# Patient Record
Sex: FEMALE | Race: WHITE | NOT HISPANIC OR LATINO | URBAN - METROPOLITAN AREA
[De-identification: names, ages, dates, MRNs, and addresses within clinical notes are randomized per-mention and may not be internally consistent; named-entity substitution may affect disease eponyms.]

---

## 2020-07-25 ENCOUNTER — TELEPHONE ENCOUNTER (OUTPATIENT)
Dept: URBAN - METROPOLITAN AREA CLINIC 13 | Facility: CLINIC | Age: 60
End: 2020-07-25

## 2020-07-25 RX ORDER — DEXLANSOPRAZOLE 30 MG/1
TAKE 1 CAPSULE TWICE DAILY CAPSULE, DELAYED RELEASE ORAL
Qty: 180 | Refills: 3 | OUTPATIENT
Start: 2014-11-06 | End: 2014-11-17

## 2020-07-26 ENCOUNTER — TELEPHONE ENCOUNTER (OUTPATIENT)
Dept: URBAN - METROPOLITAN AREA CLINIC 13 | Facility: CLINIC | Age: 60
End: 2020-07-26

## 2020-07-26 RX ORDER — PRAVASTATIN SODIUM 40 MG/1
TABLET ORAL
Qty: 30 | Refills: 0 | Status: ACTIVE | COMMUNITY
Start: 2014-04-16

## 2020-07-26 RX ORDER — CIPROFLOXACIN HYDROCHLORIDE 500 MG/1
TABLET, FILM COATED ORAL
Qty: 20 | Refills: 0 | Status: ACTIVE | COMMUNITY
Start: 2014-06-03

## 2020-07-26 RX ORDER — AMOXICILLIN 500 MG/1
CAPSULE ORAL
Qty: 20 | Refills: 0 | Status: ACTIVE | COMMUNITY
Start: 2014-06-18

## 2020-07-26 RX ORDER — LEVOTHYROXINE SODIUM 0.09 MG/1
TAKE 1 TABLET DAILY AS DIRECTED TABLET ORAL
Refills: 0 | Status: ACTIVE | COMMUNITY

## 2020-07-26 RX ORDER — LISINOPRIL 20 MG/1
TABLET ORAL
Qty: 30 | Refills: 0 | Status: ACTIVE | COMMUNITY
Start: 2013-08-19

## 2020-07-26 RX ORDER — PREDNISONE 50 MG/1
1 BY MOUTH 13 HOURS PRIOR TO STUDY, 7 HOURS PRIOR TO STUDY,AND 1 HOUR PRIOR TO IV CONTRAST INJECTION TABLET ORAL
Qty: 3 | Refills: 0 | Status: ACTIVE | COMMUNITY
Start: 2014-11-06

## 2020-07-26 RX ORDER — CIPROFLOXACIN HYDROCHLORIDE 500 MG/1
TABLET, FILM COATED ORAL
Qty: 10 | Refills: 0 | Status: ACTIVE | COMMUNITY
Start: 2013-06-07

## 2020-07-26 RX ORDER — CYCLOBENZAPRINE HYDROCHLORIDE 5 MG/1
TABLET, FILM COATED ORAL
Qty: 30 | Refills: 0 | Status: ACTIVE | COMMUNITY
Start: 2014-04-16

## 2020-07-26 RX ORDER — GABAPENTIN 300 MG/1
TAKE 1 CAPSULE TWICE DAILY CAPSULE ORAL
Qty: 60 | Refills: 0 | Status: ACTIVE | COMMUNITY

## 2020-07-26 RX ORDER — OMEPRAZOLE 20 MG/1
TAKE 1 CAPSULE TWICE DAILY CAPSULE, DELAYED RELEASE ORAL
Qty: 60 | Refills: 0 | Status: ACTIVE | COMMUNITY
Start: 2014-11-18

## 2020-07-26 RX ORDER — DULOXETINE HCL 30 MG
TAKE 1 CAPSULE DAILY CAPSULE,DELAYED RELEASE (ENTERIC COATED) ORAL
Refills: 0 | Status: ACTIVE | COMMUNITY

## 2020-07-26 RX ORDER — DEXLANSOPRAZOLE 60 MG/1
TAKE 1 CAPSULE DAILY EVERY MORNING BEFORE BREAKFAST CAPSULE, DELAYED RELEASE ORAL
Qty: 30 | Refills: 3 | Status: ACTIVE | COMMUNITY
Start: 2014-11-17

## 2020-07-26 RX ORDER — HYDROCHLOROTHIAZIDE 25 MG/1
TAKE 1 TABLET DAILY TABLET ORAL
Refills: 0 | Status: ACTIVE | COMMUNITY

## 2020-07-26 RX ORDER — PHENTERMINE HYDROCHLORIDE 30 MG/1
TABLET ORAL
Qty: 30 | Refills: 0 | Status: ACTIVE | COMMUNITY
Start: 2014-03-26

## 2020-07-26 RX ORDER — ASPIRIN 81 MG/1
TAKE 1 TABLET DAILY TABLET, DELAYED RELEASE ORAL
Refills: 0 | Status: ACTIVE | COMMUNITY

## 2020-07-26 RX ORDER — LISINOPRIL 20 MG/1
TAKE 1 TABLET DAILY AS DIRECTED TABLET ORAL
Refills: 0 | Status: ACTIVE | COMMUNITY

## 2020-07-26 RX ORDER — HYDROCHLOROTHIAZIDE 25 MG/1
TABLET ORAL
Qty: 90 | Refills: 0 | Status: ACTIVE | COMMUNITY
Start: 2014-06-18

## 2020-07-26 RX ORDER — GABAPENTIN 300 MG/1
CAPSULE ORAL
Qty: 60 | Refills: 0 | Status: ACTIVE | COMMUNITY
Start: 2014-03-26

## 2020-07-26 RX ORDER — LEVOTHYROXINE SODIUM 0.09 MG/1
TABLET ORAL
Qty: 30 | Refills: 0 | Status: ACTIVE | COMMUNITY
Start: 2014-03-26

## 2020-07-26 RX ORDER — POLYETHYLENE GLYCOL 3350, SODIUM CHLORIDE, SODIUM BICARBONATE AND POTASSIUM CHLORIDE WITH LEMON FLAVOR 420; 11.2; 5.72; 1.48 G/4L; G/4L; G/4L; G/4L
TAKE AS DIRECTED POWDER, FOR SOLUTION ORAL
Qty: 1 | Refills: 0 | Status: ACTIVE | COMMUNITY
Start: 2014-11-06

## 2020-07-26 RX ORDER — PRAVASTATIN SODIUM 40 MG/1
TAKE 1 TABLET DAILY TABLET ORAL
Refills: 0 | Status: ACTIVE | COMMUNITY

## 2020-07-26 RX ORDER — FLUCONAZOLE 100 MG/1
TABLET ORAL
Qty: 10 | Refills: 0 | Status: ACTIVE | COMMUNITY
Start: 2013-08-19

## 2020-07-26 RX ORDER — OMEPRAZOLE 20 MG/1
CAPSULE, DELAYED RELEASE ORAL
Qty: 30 | Refills: 0 | Status: ACTIVE | COMMUNITY
Start: 2014-06-03

## 2022-06-02 ENCOUNTER — TELEPHONE ENCOUNTER (OUTPATIENT)
Dept: URBAN - METROPOLITAN AREA CLINIC 113 | Facility: CLINIC | Age: 62
End: 2022-06-02

## 2022-06-03 ENCOUNTER — CLAIMS CREATED FROM THE CLAIM WINDOW (OUTPATIENT)
Dept: URBAN - METROPOLITAN AREA CLINIC 113 | Facility: CLINIC | Age: 62
End: 2022-06-03
Payer: MEDICARE

## 2022-06-03 ENCOUNTER — LAB OUTSIDE AN ENCOUNTER (OUTPATIENT)
Dept: URBAN - METROPOLITAN AREA CLINIC 113 | Facility: CLINIC | Age: 62
End: 2022-06-03

## 2022-06-03 ENCOUNTER — WEB ENCOUNTER (OUTPATIENT)
Dept: URBAN - METROPOLITAN AREA CLINIC 113 | Facility: CLINIC | Age: 62
End: 2022-06-03

## 2022-06-03 VITALS
BODY MASS INDEX: 45.99 KG/M2 | WEIGHT: 293 LBS | TEMPERATURE: 98.2 F | HEIGHT: 67 IN | DIASTOLIC BLOOD PRESSURE: 80 MMHG | RESPIRATION RATE: 18 BRPM | SYSTOLIC BLOOD PRESSURE: 128 MMHG | HEART RATE: 77 BPM

## 2022-06-03 DIAGNOSIS — E66.9 CLASS 3 OBESITY: ICD-10-CM

## 2022-06-03 DIAGNOSIS — Z79.01 CHRONIC ANTICOAGULATION: ICD-10-CM

## 2022-06-03 DIAGNOSIS — R19.4 CHANGE IN BOWEL HABITS: ICD-10-CM

## 2022-06-03 DIAGNOSIS — R10.32 LLQ ABDOMINAL PAIN: ICD-10-CM

## 2022-06-03 PROBLEM — 819948005: Status: ACTIVE | Noted: 2022-06-03

## 2022-06-03 PROBLEM — 711150003: Status: ACTIVE | Noted: 2022-06-03

## 2022-06-03 PROCEDURE — 99214 OFFICE O/P EST MOD 30 MIN: CPT | Performed by: INTERNAL MEDICINE

## 2022-06-03 RX ORDER — HYDROCHLOROTHIAZIDE 25 MG/1
TAKE 1 TABLET DAILY TABLET ORAL
Refills: 0 | Status: DISCONTINUED | COMMUNITY

## 2022-06-03 RX ORDER — FLUCONAZOLE 100 MG/1
TABLET ORAL
Qty: 10 | Refills: 0 | Status: DISCONTINUED | COMMUNITY
Start: 2013-08-19

## 2022-06-03 RX ORDER — PRAVASTATIN SODIUM 40 MG/1
TAKE 1 TABLET DAILY TABLET ORAL
Refills: 0 | Status: DISCONTINUED | COMMUNITY

## 2022-06-03 RX ORDER — OMEPRAZOLE 40 MG/1
1 CAPSULE 30 MINUTES BEFORE MORNING MEAL CAPSULE, DELAYED RELEASE ORAL ONCE A DAY
Refills: 0 | Status: ACTIVE | COMMUNITY
Start: 2014-06-03

## 2022-06-03 RX ORDER — PREDNISONE 50 MG/1
1 BY MOUTH 13 HOURS PRIOR TO STUDY, 7 HOURS PRIOR TO STUDY,AND 1 HOUR PRIOR TO IV CONTRAST INJECTION TABLET ORAL
Qty: 3 | Refills: 0 | Status: DISCONTINUED | COMMUNITY
Start: 2014-11-06

## 2022-06-03 RX ORDER — LEVOTHYROXINE SODIUM 100 UG/1
1 TABLET IN THE MORNING ON AN EMPTY STOMACH TABLET ORAL ONCE A DAY
Refills: 0 | Status: ACTIVE | COMMUNITY

## 2022-06-03 RX ORDER — POLYETHYLENE GLYCOL 3350, SODIUM CHLORIDE, SODIUM BICARBONATE AND POTASSIUM CHLORIDE WITH LEMON FLAVOR 420; 11.2; 5.72; 1.48 G/4L; G/4L; G/4L; G/4L
TAKE AS DIRECTED POWDER, FOR SOLUTION ORAL
Qty: 1 | Refills: 0 | Status: DISCONTINUED | COMMUNITY
Start: 2014-11-06

## 2022-06-03 RX ORDER — APIXABAN 5 MG/1
1 TABLET TABLET, FILM COATED ORAL
Status: ACTIVE | COMMUNITY

## 2022-06-03 RX ORDER — CYCLOBENZAPRINE HYDROCHLORIDE 5 MG/1
TABLET, FILM COATED ORAL
Qty: 30 | Refills: 0 | Status: DISCONTINUED | COMMUNITY
Start: 2014-04-16

## 2022-06-03 RX ORDER — ASPIRIN 81 MG/1
TAKE 1 TABLET DAILY TABLET, DELAYED RELEASE ORAL
Refills: 0 | Status: DISCONTINUED | COMMUNITY

## 2022-06-03 RX ORDER — LINACLOTIDE 290 UG/1
1 CAPSULE AT LEAST 30 MINUTES BEFORE THE FIRST MEAL OF THE DAY ON AN EMPTY STOMACH CAPSULE, GELATIN COATED ORAL ONCE A DAY
OUTPATIENT

## 2022-06-03 RX ORDER — LISINOPRIL 20 MG/1
TAKE 1 TABLET DAILY AS DIRECTED TABLET ORAL
Refills: 0 | Status: DISCONTINUED | COMMUNITY

## 2022-06-03 RX ORDER — LINACLOTIDE 290 UG/1
1 CAPSULE AT LEAST 30 MINUTES BEFORE THE FIRST MEAL OF THE DAY ON AN EMPTY STOMACH CAPSULE, GELATIN COATED ORAL ONCE A DAY
Status: ACTIVE | COMMUNITY

## 2022-06-03 RX ORDER — CHOLECALCIFEROL TAB 50 MCG (2000 UNIT) 50 MCG
ONCE PER WEEK TAB ORAL
Status: ACTIVE | COMMUNITY

## 2022-06-03 RX ORDER — DICYCLOMINE HYDROCHLORIDE 10 MG/1
1 TABLET CAPSULE ORAL
Status: ACTIVE | COMMUNITY

## 2022-06-03 RX ORDER — DICYCLOMINE HYDROCHLORIDE 10 MG/1
1 TABLET CAPSULE ORAL
Qty: 90 | Refills: 5

## 2022-06-03 RX ORDER — CIPROFLOXACIN HYDROCHLORIDE 500 MG/1
TABLET, FILM COATED ORAL
Qty: 10 | Refills: 0 | Status: DISCONTINUED | COMMUNITY
Start: 2013-06-07

## 2022-06-03 RX ORDER — DEXLANSOPRAZOLE 60 MG/1
TAKE 1 CAPSULE DAILY EVERY MORNING BEFORE BREAKFAST CAPSULE, DELAYED RELEASE ORAL
Qty: 30 | Refills: 3 | Status: DISCONTINUED | COMMUNITY
Start: 2014-11-17

## 2022-06-03 RX ORDER — GABAPENTIN 300 MG/1
2 CAOS CAPSULE ORAL
Refills: 0 | Status: ACTIVE | COMMUNITY

## 2022-06-03 RX ORDER — DILTIAZEM HYDROCHLORIDE 180 MG/1
1 CAPSULE CAPSULE, COATED, EXTENDED RELEASE ORAL
Status: ACTIVE | COMMUNITY

## 2022-06-03 RX ORDER — PHENTERMINE HYDROCHLORIDE 30 MG/1
TABLET ORAL
Qty: 30 | Refills: 0 | Status: DISCONTINUED | COMMUNITY
Start: 2014-03-26

## 2022-06-03 RX ORDER — AMOXICILLIN 500 MG/1
CAPSULE ORAL
Qty: 20 | Refills: 0 | Status: DISCONTINUED | COMMUNITY
Start: 2014-06-18

## 2022-06-03 RX ORDER — DULOXETINE HYDROCHLORIDE 60 MG/1
1 CAPSULE CAPSULE, DELAYED RELEASE ORAL ONCE A DAY
Refills: 0 | Status: ACTIVE | COMMUNITY

## 2022-06-03 RX ORDER — ROSUVASTATIN CALCIUM 20 MG/1
1 TABLET TABLET, FILM COATED ORAL ONCE A DAY
Status: ACTIVE | COMMUNITY

## 2022-06-03 NOTE — HPI-TODAY'S VISIT:
Ms. Pereira is a 61-year-old woman with multiple medical problems including chronic renal insufficiency, irritable bowel syndrome, history of gastric sleeve, atrial fibrillation status post ablation on Eliquis presenting for evaluation of left lower quadrant pain and change in bowel habits. She reports being in her usual state of health until 2 months ago when she developed nausea, vomiting and diarrhea.  The nausea and vomiting fairly quickly resolved but she has continued to have loose, thinner caliber stools since that time.  She also reports an increased amount of left lower quadrant abdominal pain.  She presented to the [facility] emergency room on 2 occasions, 5/7/2022 then 5/16/2022 when she was hospitalized until 5/19/2022 for complicated urinary tract infection.  Her most recent imaging studies were notable for CT abdomen pelvis with contrast on 5/7/2022 that shows an abnormality of the inferior right renal pole, new left for millimeter lower lobe pulmonary nodule with multiple other sub-3 mm pulmonary nodules, stable postsurgical changes of the left nephrectomy, 3.1 cm left adnexal cystic lesion increased in size from prior study.  Labs during that admission showed normal basic metabolic panel, liver function tests, CBC, lipase.  During that hospitalization she was seen by surgery who performed an upper GI series showing postsurgical changes of a sleeve gastrectomy otherwise unremarkable upper GI.  She is recommended to follow-up with regarding her abdominal pain change in bowel habits. Since the hospitalization she reports having constipation and was recently started on Linzess with some improvement.  She reports that Bentyl has been somewhat helpful to relieve her exacerbations of left lower quadrant pain.  She denies any red blood per rectum.  No fevers or weight loss.  She has heartburn that is controlled on omeprazole 40 mg daily.  She denies any dysphagia or vomiting.  She is currently under a lot of emotional distress related to her 's cancer diagnosis and related treatment. She was last seen for an EGD and colonoscopy in 2014 by Dr. Antoine.  EGD was notable for a 5 cm hiatal hernia status post empiric dilation of the esophagus and a normal stomach and duodenum.  The colonoscopy was unremarkable except for nonbleeding internal hemorrhoids.

## 2022-06-13 ENCOUNTER — TELEPHONE ENCOUNTER (OUTPATIENT)
Dept: URBAN - METROPOLITAN AREA CLINIC 113 | Facility: CLINIC | Age: 62
End: 2022-06-13

## 2022-06-14 ENCOUNTER — TELEPHONE ENCOUNTER (OUTPATIENT)
Dept: URBAN - METROPOLITAN AREA CLINIC 113 | Facility: CLINIC | Age: 62
End: 2022-06-14

## 2022-06-14 PROBLEM — 129851009: Status: ACTIVE | Noted: 2022-06-03

## 2022-06-14 RX ORDER — POLYETHYLENE GLYCOL 3350, SODIUM CHLORIDE, SODIUM BICARBONATE AND POTASSIUM CHLORIDE 420G
AS DIRECTED KIT ORAL ONCE
Qty: 420 GRAM | Refills: 0 | OUTPATIENT
Start: 2022-06-14 | End: 2022-06-15

## 2022-06-14 RX ORDER — LEVOTHYROXINE SODIUM 100 UG/1
1 TABLET IN THE MORNING ON AN EMPTY STOMACH TABLET ORAL ONCE A DAY
Refills: 0 | Status: ACTIVE | COMMUNITY

## 2022-06-14 RX ORDER — DULOXETINE HYDROCHLORIDE 60 MG/1
1 CAPSULE CAPSULE, DELAYED RELEASE ORAL ONCE A DAY
Refills: 0 | Status: ACTIVE | COMMUNITY

## 2022-06-14 RX ORDER — OMEPRAZOLE 40 MG/1
1 CAPSULE 30 MINUTES BEFORE MORNING MEAL CAPSULE, DELAYED RELEASE ORAL ONCE A DAY
Refills: 0 | Status: ACTIVE | COMMUNITY
Start: 2014-06-03

## 2022-06-14 RX ORDER — ROSUVASTATIN CALCIUM 20 MG/1
1 TABLET TABLET, FILM COATED ORAL ONCE A DAY
Status: ACTIVE | COMMUNITY

## 2022-06-14 RX ORDER — POLYETHYLENE GLYCOL 3350, SODIUM CHLORIDE, SODIUM BICARBONATE, POTASSIUM CHLORIDE 420; 11.2; 5.72; 1.48 G/4L; G/4L; G/4L; G/4L
AS DIRECTED POWDER, FOR SOLUTION ORAL
OUTPATIENT
Start: 2022-06-14

## 2022-06-14 RX ORDER — APIXABAN 5 MG/1
1 TABLET TABLET, FILM COATED ORAL
Status: ACTIVE | COMMUNITY

## 2022-06-14 RX ORDER — DICYCLOMINE HYDROCHLORIDE 10 MG/1
1 TABLET CAPSULE ORAL
Qty: 90 | Refills: 5 | Status: ACTIVE | COMMUNITY

## 2022-06-14 RX ORDER — CHOLECALCIFEROL TAB 50 MCG (2000 UNIT) 50 MCG
ONCE PER WEEK TAB ORAL
Status: ACTIVE | COMMUNITY

## 2022-06-14 RX ORDER — LINACLOTIDE 290 UG/1
1 CAPSULE AT LEAST 30 MINUTES BEFORE THE FIRST MEAL OF THE DAY ON AN EMPTY STOMACH CAPSULE, GELATIN COATED ORAL ONCE A DAY
Status: ACTIVE | COMMUNITY

## 2022-06-14 RX ORDER — GABAPENTIN 300 MG/1
2 CAOS CAPSULE ORAL
Refills: 0 | Status: ACTIVE | COMMUNITY

## 2022-06-14 RX ORDER — DILTIAZEM HYDROCHLORIDE 180 MG/1
1 CAPSULE CAPSULE, COATED, EXTENDED RELEASE ORAL
Status: ACTIVE | COMMUNITY

## 2022-06-17 ENCOUNTER — OFFICE VISIT (OUTPATIENT)
Dept: URBAN - METROPOLITAN AREA MEDICAL CENTER 19 | Facility: MEDICAL CENTER | Age: 62
End: 2022-06-17
Payer: MEDICARE

## 2022-06-17 DIAGNOSIS — K58.9 COLON SPASM: ICD-10-CM

## 2022-06-17 DIAGNOSIS — R10.32 LLQ ABDOMINAL PAIN: ICD-10-CM

## 2022-06-17 DIAGNOSIS — K64.0 BLEEDING GRADE I HEMORRHOIDS: ICD-10-CM

## 2022-06-17 DIAGNOSIS — K57.30 ACQUIRED DIVERTICULOSIS OF COLON: ICD-10-CM

## 2022-06-17 PROCEDURE — 45378 DIAGNOSTIC COLONOSCOPY: CPT | Performed by: INTERNAL MEDICINE

## 2022-06-17 RX ORDER — DULOXETINE HYDROCHLORIDE 60 MG/1
1 CAPSULE CAPSULE, DELAYED RELEASE ORAL ONCE A DAY
Refills: 0 | Status: ACTIVE | COMMUNITY

## 2022-06-17 RX ORDER — ROSUVASTATIN CALCIUM 20 MG/1
1 TABLET TABLET, FILM COATED ORAL ONCE A DAY
Status: ACTIVE | COMMUNITY

## 2022-06-17 RX ORDER — CHOLECALCIFEROL TAB 50 MCG (2000 UNIT) 50 MCG
ONCE PER WEEK TAB ORAL
Status: ACTIVE | COMMUNITY

## 2022-06-17 RX ORDER — LINACLOTIDE 290 UG/1
1 CAPSULE AT LEAST 30 MINUTES BEFORE THE FIRST MEAL OF THE DAY ON AN EMPTY STOMACH CAPSULE, GELATIN COATED ORAL ONCE A DAY
Status: ACTIVE | COMMUNITY

## 2022-06-17 RX ORDER — APIXABAN 5 MG/1
1 TABLET TABLET, FILM COATED ORAL
Status: ACTIVE | COMMUNITY

## 2022-06-17 RX ORDER — LEVOTHYROXINE SODIUM 100 UG/1
1 TABLET IN THE MORNING ON AN EMPTY STOMACH TABLET ORAL ONCE A DAY
Refills: 0 | Status: ACTIVE | COMMUNITY

## 2022-06-17 RX ORDER — GABAPENTIN 300 MG/1
2 CAOS CAPSULE ORAL
Refills: 0 | Status: ACTIVE | COMMUNITY

## 2022-06-17 RX ORDER — OMEPRAZOLE 40 MG/1
1 CAPSULE 30 MINUTES BEFORE MORNING MEAL CAPSULE, DELAYED RELEASE ORAL ONCE A DAY
Refills: 0 | Status: ACTIVE | COMMUNITY
Start: 2014-06-03

## 2022-06-17 RX ORDER — DICYCLOMINE HYDROCHLORIDE 10 MG/1
1 TABLET CAPSULE ORAL
Qty: 90 | Refills: 11 | OUTPATIENT

## 2022-06-17 RX ORDER — DILTIAZEM HYDROCHLORIDE 180 MG/1
1 CAPSULE CAPSULE, COATED, EXTENDED RELEASE ORAL
Status: ACTIVE | COMMUNITY

## 2022-06-17 RX ORDER — POLYETHYLENE GLYCOL 3350, SODIUM CHLORIDE, SODIUM BICARBONATE, POTASSIUM CHLORIDE 420; 11.2; 5.72; 1.48 G/4L; G/4L; G/4L; G/4L
AS DIRECTED POWDER, FOR SOLUTION ORAL
Status: ACTIVE | COMMUNITY
Start: 2022-06-14

## 2022-06-17 RX ORDER — DICYCLOMINE HYDROCHLORIDE 10 MG/1
1 TABLET CAPSULE ORAL
Qty: 90 | Refills: 5 | Status: ACTIVE | COMMUNITY

## 2022-07-05 PROBLEM — 10743008 COLON SPASM: Status: ACTIVE | Noted: 2022-07-05

## 2022-07-05 PROBLEM — 301716002: Status: ACTIVE | Noted: 2022-06-03

## 2022-07-27 ENCOUNTER — OFFICE VISIT (OUTPATIENT)
Dept: URBAN - METROPOLITAN AREA CLINIC 113 | Facility: CLINIC | Age: 62
End: 2022-07-27

## 2022-07-27 RX ORDER — OMEPRAZOLE 40 MG/1
1 CAPSULE 30 MINUTES BEFORE MORNING MEAL CAPSULE, DELAYED RELEASE ORAL ONCE A DAY
Refills: 0 | Status: ACTIVE | COMMUNITY
Start: 2014-06-03

## 2022-07-27 RX ORDER — ROSUVASTATIN CALCIUM 20 MG/1
1 TABLET TABLET, FILM COATED ORAL ONCE A DAY
Status: ACTIVE | COMMUNITY

## 2022-07-27 RX ORDER — APIXABAN 5 MG/1
1 TABLET TABLET, FILM COATED ORAL
Status: ACTIVE | COMMUNITY

## 2022-07-27 RX ORDER — DULOXETINE HYDROCHLORIDE 60 MG/1
1 CAPSULE CAPSULE, DELAYED RELEASE ORAL ONCE A DAY
Refills: 0 | Status: ACTIVE | COMMUNITY

## 2022-07-27 RX ORDER — CHOLECALCIFEROL TAB 50 MCG (2000 UNIT) 50 MCG
ONCE PER WEEK TAB ORAL
Status: ACTIVE | COMMUNITY

## 2022-07-27 RX ORDER — POLYETHYLENE GLYCOL 3350, SODIUM CHLORIDE, SODIUM BICARBONATE, POTASSIUM CHLORIDE 420; 11.2; 5.72; 1.48 G/4L; G/4L; G/4L; G/4L
AS DIRECTED POWDER, FOR SOLUTION ORAL
Status: ACTIVE | COMMUNITY
Start: 2022-06-14

## 2022-07-27 RX ORDER — DILTIAZEM HYDROCHLORIDE 180 MG/1
1 CAPSULE CAPSULE, COATED, EXTENDED RELEASE ORAL
Status: ACTIVE | COMMUNITY

## 2022-07-27 RX ORDER — DICYCLOMINE HYDROCHLORIDE 10 MG/1
1 TABLET CAPSULE ORAL
Qty: 90 | Refills: 5 | Status: ACTIVE | COMMUNITY

## 2022-07-27 RX ORDER — DICYCLOMINE HYDROCHLORIDE 10 MG/1
1 TABLET CAPSULE ORAL
Qty: 90 | Refills: 11 | Status: ACTIVE | COMMUNITY

## 2022-07-27 RX ORDER — LINACLOTIDE 290 UG/1
1 CAPSULE AT LEAST 30 MINUTES BEFORE THE FIRST MEAL OF THE DAY ON AN EMPTY STOMACH CAPSULE, GELATIN COATED ORAL ONCE A DAY
Status: ACTIVE | COMMUNITY

## 2022-07-27 RX ORDER — LEVOTHYROXINE SODIUM 100 UG/1
1 TABLET IN THE MORNING ON AN EMPTY STOMACH TABLET ORAL ONCE A DAY
Refills: 0 | Status: ACTIVE | COMMUNITY

## 2022-07-27 RX ORDER — GABAPENTIN 300 MG/1
2 CAOS CAPSULE ORAL
Refills: 0 | Status: ACTIVE | COMMUNITY

## 2022-07-27 NOTE — HPI-TODAY'S VISIT:
Ms. Pereira is a 61-year-old woman with multiple medical problems including chronic renal insufficiency, irritable bowel syndrome, history of gastric sleeve, atrial fibrillation status post ablation on Eliquis presenting for evaluation of left lower quadrant pain and change in bowel habits. She reports being in her usual state of health until 2 months ago when she developed nausea, vomiting and diarrhea.  The nausea and vomiting fairly quickly resolved but she has continued to have loose, thinner caliber stools since that time.  She also reports an increased amount of left lower quadrant abdominal pain.  She presented to the [facility] emergency room on 2 occasions, 5/7/2022 then 5/16/2022 when she was hospitalized until 5/19/2022 for complicated urinary tract infection.  Her most recent imaging studies were notable for CT abdomen pelvis with contrast on 5/7/2022 that shows an abnormality of the inferior right renal pole, new left for millimeter lower lobe pulmonary nodule with multiple other sub-3 mm pulmonary nodules, stable postsurgical changes of the left nephrectomy, 3.1 cm left adnexal cystic lesion increased in size from prior study.  Labs during that admission showed normal basic metabolic panel, liver function tests, CBC, lipase.  During that hospitalization she was seen by surgery who performed an upper GI series showing postsurgical changes of a sleeve gastrectomy otherwise unremarkable upper GI.  She is recommended to follow-up with regarding her abdominal pain change in bowel habits. Since the hospitalization she reports having constipation and was recently started on Linzess with some improvement.  She reports that Bentyl has been somewhat helpful to relieve her exacerbations of left lower quadrant pain.  She denies any red blood per rectum.  No fevers or weight loss.  She has heartburn that is controlled on omeprazole 40 mg daily.  She denies any dysphagia or vomiting.  She is currently under a lot of emotional distress related to her 's cancer diagnosis and related treatment. She was last seen for an EGD and colonoscopy in 2014 by Dr. Antoine.  EGD was notable for a 5 cm hiatal hernia status post empiric dilation of the esophagus and a normal stomach and duodenum.  The colonoscopy was unremarkable except for nonbleeding internal hemorrhoids.  Interval history: 61-year-old female with multiple medical problems including chronic renal insufficiency, irritable bowel syndrome, history of gastric sleeve, atrial fibrillation status post ablation on Eliquis, status post sleeve gastrectomy with hiatal hernia repair (2018) presents for follow-up after colonoscopy.  She was last seen on 6/3/2022.  She reported left lower quadrant pain and a change in bowel habits most likely secondary to IBS.  Recent CT scan of the abdomen/pelvis with contrast was unremarkable.  She was planned for colonoscopy to evaluate for any significant pathology.  She was to hold Eliquis for 2 days prior.  Hospital colonoscopy 6/7/2022:Performed without difficulty.  BPS score of 6.  Mild diverticulosis in sigmoid colon.  Significant colonic spasm.  Grade 1 nonbleeding internal hemorrhoids.  Otherwise normal.  Repeat colonoscopy in 10 years for screening.  Per records, patient was seen by Dr. El on 7/6/2022.  She reported weight gain secondary to stress eating.  She was recommended to follow-up with her registered dietitian for better adherence to bariatric diet.  She did also complain of constipation unresponsive to Linzess.  She was started on Amitiza 24 mcg twice daily.

## 2022-11-21 ENCOUNTER — TELEPHONE ENCOUNTER (OUTPATIENT)
Dept: URBAN - METROPOLITAN AREA CLINIC 113 | Facility: CLINIC | Age: 62
End: 2022-11-21

## 2022-11-21 RX ORDER — DICYCLOMINE HYDROCHLORIDE 10 MG/1
1 TABLET CAPSULE ORAL
Qty: 180 | Refills: 0

## 2023-01-03 ENCOUNTER — ERX REFILL RESPONSE (OUTPATIENT)
Dept: URBAN - METROPOLITAN AREA CLINIC 113 | Facility: CLINIC | Age: 63
End: 2023-01-03

## 2023-01-03 RX ORDER — DICYCLOMINE HYDROCHLORIDE 10 MG/1
TAKE ONE CAPSULE BY MOUTH FOUR TIMES A DAY AS NEEDED FOR ABDOMINAL PAIN FOR 90 DAYS CAPSULE ORAL
Qty: 90 CAPSULE | Refills: 0 | OUTPATIENT

## 2023-01-03 RX ORDER — DICYCLOMINE HYDROCHLORIDE 10 MG/1
1 TABLET CAPSULE ORAL
Qty: 180 | Refills: 0 | OUTPATIENT

## 2023-01-23 ENCOUNTER — TELEPHONE ENCOUNTER (OUTPATIENT)
Dept: URBAN - METROPOLITAN AREA CLINIC 113 | Facility: CLINIC | Age: 63
End: 2023-01-23

## 2023-01-23 RX ORDER — DICYCLOMINE HYDROCHLORIDE 10 MG/1
1 TABLET CAPSULE ORAL
Qty: 180 | Refills: 1 | OUTPATIENT
Start: 2023-01-23 | End: 2023-07-22

## 2023-08-24 ENCOUNTER — TELEPHONE ENCOUNTER (OUTPATIENT)
Dept: URBAN - METROPOLITAN AREA CLINIC 113 | Facility: CLINIC | Age: 63
End: 2023-08-24

## 2023-08-24 RX ORDER — DICYCLOMINE HYDROCHLORIDE 10 MG/1
TAKE ONE CAPSULE BY MOUTH FOUR TIMES A DAY AS NEEDED FOR ABDOMINAL PAIN FOR 90 DAYS CAPSULE ORAL
Qty: 90 CAPSULE | Refills: 0

## 2023-10-12 ENCOUNTER — TELEPHONE ENCOUNTER (OUTPATIENT)
Dept: URBAN - METROPOLITAN AREA CLINIC 113 | Facility: CLINIC | Age: 63
End: 2023-10-12

## 2023-10-12 RX ORDER — DICYCLOMINE HYDROCHLORIDE 10 MG/1
1 CAPSULE CAPSULE ORAL
Qty: 90 | Refills: 1

## 2023-11-20 ENCOUNTER — CLAIMS CREATED FROM THE CLAIM WINDOW (OUTPATIENT)
Dept: URBAN - METROPOLITAN AREA CLINIC 113 | Facility: CLINIC | Age: 63
End: 2023-11-20
Payer: MEDICARE

## 2023-11-20 ENCOUNTER — DASHBOARD ENCOUNTERS (OUTPATIENT)
Age: 63
End: 2023-11-20

## 2023-11-20 VITALS
HEART RATE: 97 BPM | SYSTOLIC BLOOD PRESSURE: 133 MMHG | WEIGHT: 293 LBS | RESPIRATION RATE: 16 BRPM | HEIGHT: 67 IN | BODY MASS INDEX: 45.99 KG/M2 | DIASTOLIC BLOOD PRESSURE: 94 MMHG | TEMPERATURE: 97.5 F

## 2023-11-20 DIAGNOSIS — K59.09 CHRONIC CONSTIPATION: ICD-10-CM

## 2023-11-20 DIAGNOSIS — R10.32 LLQ ABDOMINAL PAIN: ICD-10-CM

## 2023-11-20 PROBLEM — 236069009: Status: ACTIVE | Noted: 2023-11-20

## 2023-11-20 PROCEDURE — 99214 OFFICE O/P EST MOD 30 MIN: CPT | Performed by: NURSE PRACTITIONER

## 2023-11-20 RX ORDER — GABAPENTIN 300 MG/1
2 CAOS CAPSULE ORAL
Refills: 0 | Status: ACTIVE | COMMUNITY

## 2023-11-20 RX ORDER — CHOLECALCIFEROL TAB 50 MCG (2000 UNIT) 50 MCG
ONCE PER WEEK TAB ORAL
Status: ACTIVE | COMMUNITY

## 2023-11-20 RX ORDER — APIXABAN 5 MG/1
1 TABLET TABLET, FILM COATED ORAL
Status: ACTIVE | COMMUNITY

## 2023-11-20 RX ORDER — POLYETHYLENE GLYCOL 3350, SODIUM CHLORIDE, SODIUM BICARBONATE, POTASSIUM CHLORIDE 420; 11.2; 5.72; 1.48 G/4L; G/4L; G/4L; G/4L
AS DIRECTED POWDER, FOR SOLUTION ORAL
Status: ACTIVE | COMMUNITY
Start: 2022-06-14

## 2023-11-20 RX ORDER — ROSUVASTATIN CALCIUM 20 MG/1
1 TABLET TABLET, FILM COATED ORAL ONCE A DAY
Status: ACTIVE | COMMUNITY

## 2023-11-20 RX ORDER — DICYCLOMINE HYDROCHLORIDE 10 MG/1
1 CAPSULE CAPSULE ORAL
Qty: 90 | Refills: 5

## 2023-11-20 RX ORDER — DICYCLOMINE HYDROCHLORIDE 10 MG/1
1 CAPSULE CAPSULE ORAL
Qty: 90 | Refills: 1 | Status: ACTIVE | COMMUNITY

## 2023-11-20 RX ORDER — LINACLOTIDE 290 UG/1
1 CAPSULE AT LEAST 30 MINUTES BEFORE THE FIRST MEAL OF THE DAY ON AN EMPTY STOMACH CAPSULE, GELATIN COATED ORAL ONCE A DAY
Qty: 30 | Refills: 11

## 2023-11-20 RX ORDER — LEVOTHYROXINE SODIUM 100 UG/1
1 TABLET IN THE MORNING ON AN EMPTY STOMACH TABLET ORAL ONCE A DAY
Refills: 0 | Status: ACTIVE | COMMUNITY

## 2023-11-20 RX ORDER — LINACLOTIDE 290 UG/1
1 CAPSULE AT LEAST 30 MINUTES BEFORE THE FIRST MEAL OF THE DAY ON AN EMPTY STOMACH CAPSULE, GELATIN COATED ORAL ONCE A DAY
Status: ACTIVE | COMMUNITY

## 2023-11-20 RX ORDER — OMEPRAZOLE 40 MG/1
1 CAPSULE 30 MINUTES BEFORE MORNING MEAL CAPSULE, DELAYED RELEASE ORAL ONCE A DAY
Refills: 0 | Status: ACTIVE | COMMUNITY
Start: 2014-06-03

## 2023-11-20 RX ORDER — DILTIAZEM HYDROCHLORIDE 180 MG/1
1 CAPSULE CAPSULE, COATED, EXTENDED RELEASE ORAL
Status: ACTIVE | COMMUNITY

## 2023-11-20 RX ORDER — DULOXETINE HYDROCHLORIDE 60 MG/1
1 CAPSULE CAPSULE, DELAYED RELEASE ORAL ONCE A DAY
Refills: 0 | Status: ACTIVE | COMMUNITY

## 2023-11-20 NOTE — PHYSICAL EXAM GASTROINTESTINAL
Abdomen , soft, nontender, nondistended , no guarding or rigidity , no masses palpable , normal bowel sounds , Liver and Spleen , no hepatosplenomegaly; exam limited due to body habitus and patient position.

## 2023-11-20 NOTE — HPI-OTHER HISTORIES
Colonoscopy 6/17/2022:BBPS 6, sigmoid diverticulosis, significant colonic spasm, nonbleeding internal hemorrhoids, grade 1. Otherwise normal ileocolonoscopy. Colonoscopy for screening recommended in 2032. Recall set.

## 2023-11-20 NOTE — HPI-TODAY'S VISIT:
This is a 63-year-old woman with a history of multiple medical problems including chronic renal insufficiency, irritable bowel syndrome, gastric sleeve, atrial fibrillation status post ablation on Eliquis, left lower quadrant abdominal pain, and a change in bowel habits presenting for follow-up. She was last seen 6/3/2023 for an office visit.  She was complaining of left lower quadrant abdominal pain and change in bowel habits most likely secondary to IBS.  A recent CT abdomen and pelvis with contrast was unremarkable.  Colonoscopy was scheduled to assess for pathology.  She was to hold Eliquis for 2 days.  She was prescribed dicyclomine 10 mg as needed and was to continue Linzess 290 mcg daily. Colonoscopy 6/17/2022:BBPS 6, sigmoid diverticulosis, significant colonic spasm, nonbleeding internal hemorrhoids, grade 1.  Otherwise normal ileocolonoscopy.  Colonoscopy for screening recommended in 2032.  Recall set. She reports that her insurance would not cover Linzess.  She has been taking MiraLAX 1 capful every other day and 2 stool softeners per day.  She is having bowel movements most days, occasionally skipping a single day.  Stool volume is variable.  She has been taking dicyclomine 4 times a day routinely since her colonoscopy.  Symptoms have been managed. On 11/15/2023, she had sudden onset of pain in the left lower quadrant.  She reports that she was doubled over and crying.  She had associated back pain, rectal pain, and nausea and vomiting.  She was unable to tolerate food  or liquids for 3 days.  She had 2 bowel movements the day of symptom onset in did not have a bowel movement for 2 days.  On 11/16, she had a fever of 101.  On 11/18, she was able to tolerate broth and ginger ale.  She is currently tolerating bland, soft food.  Her daughter is a nurse, advised.  Emergency department.  She did not want to leave her  who is on chemotherapy for multiple myeloma.  Abdominal pain has improved.  She reports losing a few pounds due to her inability to eat.  She reports similar symptoms prior to her visit to the emergency department in May 2022;  However, she did not experience nausea and vomiting at that time.

## 2024-05-20 ENCOUNTER — OFFICE VISIT (OUTPATIENT)
Dept: URBAN - METROPOLITAN AREA CLINIC 113 | Facility: CLINIC | Age: 64
End: 2024-05-20

## 2024-05-22 ENCOUNTER — TELEPHONE ENCOUNTER (OUTPATIENT)
Dept: URBAN - METROPOLITAN AREA CLINIC 113 | Facility: CLINIC | Age: 64
End: 2024-05-22

## 2024-08-16 ENCOUNTER — OFFICE VISIT (OUTPATIENT)
Dept: URBAN - METROPOLITAN AREA CLINIC 113 | Facility: CLINIC | Age: 64
End: 2024-08-16

## 2024-08-16 RX ORDER — ROSUVASTATIN 20 MG/1
1 TABLET TABLET, FILM COATED ORAL ONCE A DAY
Status: ACTIVE | COMMUNITY

## 2024-08-16 RX ORDER — LINACLOTIDE 290 UG/1
1 CAPSULE AT LEAST 30 MINUTES BEFORE THE FIRST MEAL OF THE DAY ON AN EMPTY STOMACH CAPSULE, GELATIN COATED ORAL ONCE A DAY
Qty: 30 | Refills: 11 | Status: ACTIVE | COMMUNITY

## 2024-08-16 RX ORDER — DULOXETINE HYDROCHLORIDE 60 MG/1
1 CAPSULE CAPSULE, DELAYED RELEASE ORAL ONCE A DAY
Refills: 0 | Status: ACTIVE | COMMUNITY

## 2024-08-16 RX ORDER — CHOLECALCIFEROL TAB 50 MCG (2000 UNIT) 50 MCG
ONCE PER WEEK TAB ORAL
Status: ACTIVE | COMMUNITY

## 2024-08-16 RX ORDER — POLYETHYLENE GLYCOL 3350, SODIUM CHLORIDE, SODIUM BICARBONATE, POTASSIUM CHLORIDE 420; 11.2; 5.72; 1.48 G/4L; G/4L; G/4L; G/4L
AS DIRECTED POWDER, FOR SOLUTION ORAL
Status: ACTIVE | COMMUNITY
Start: 2022-06-14

## 2024-08-16 RX ORDER — DILTIAZEM HYDROCHLORIDE 180 MG/1
1 CAPSULE CAPSULE, COATED, EXTENDED RELEASE ORAL
Status: ACTIVE | COMMUNITY

## 2024-08-16 RX ORDER — GABAPENTIN 300 MG/1
2 CAOS CAPSULE ORAL
Refills: 0 | Status: ACTIVE | COMMUNITY

## 2024-08-16 RX ORDER — OMEPRAZOLE 40 MG/1
1 CAPSULE 30 MINUTES BEFORE MORNING MEAL CAPSULE, DELAYED RELEASE ORAL ONCE A DAY
Refills: 0 | Status: ACTIVE | COMMUNITY
Start: 2014-06-03

## 2024-08-16 RX ORDER — APIXABAN 5 MG/1
1 TABLET TABLET, FILM COATED ORAL
Status: ACTIVE | COMMUNITY

## 2024-08-16 RX ORDER — DICYCLOMINE HYDROCHLORIDE 10 MG/1
1 TABLET CAPSULE ORAL
Qty: 90 | Refills: 3 | Status: ACTIVE | COMMUNITY

## 2024-08-16 RX ORDER — LEVOTHYROXINE SODIUM 100 UG/1
1 TABLET IN THE MORNING ON AN EMPTY STOMACH TABLET ORAL ONCE A DAY
Refills: 0 | Status: ACTIVE | COMMUNITY

## 2024-08-16 NOTE — HPI-TODAY'S VISIT:
This is a 63-year-old woman with a history of multiple medical problems including chronic renal insufficiency, irritable bowel syndrome, gastric sleeve, atrial fibrillation status post ablation on Eliquis, colon cancer screening up-to-date and due in 2032, left lower quadrant abdominal pain, and chronic constipation presenting for follow-up. She was last seen in the office 11/20/2023.  She had left lower quadrant abdominal pain and constipation associated with IBS.  She had experienced a recent exacerbation possibly associated with a viral illness.  Her symptoms had significantly improved.  She had benefited from taking Linzess but her insurance company would not cover it.  Insurance had changed to Medicare.  Linzess 290 mcg daily was  prescribed.  She was to add stool softeners or MiraLAX if needed and continue dicyclomine as needed for abdominal pain.  She had been taking it routinely and was instructed to use it as needed only.  She was to take 2 tablets per dose if needed.

## 2024-08-30 ENCOUNTER — OFFICE VISIT (OUTPATIENT)
Dept: URBAN - METROPOLITAN AREA CLINIC 113 | Facility: CLINIC | Age: 64
End: 2024-08-30

## 2024-08-30 VITALS
DIASTOLIC BLOOD PRESSURE: 93 MMHG | BODY MASS INDEX: 45.99 KG/M2 | SYSTOLIC BLOOD PRESSURE: 143 MMHG | WEIGHT: 293 LBS | TEMPERATURE: 97.9 F | RESPIRATION RATE: 22 BRPM | HEIGHT: 67 IN

## 2024-08-30 PROBLEM — 266435005: Status: ACTIVE | Noted: 2024-08-30

## 2024-08-30 PROBLEM — 422587007: Status: ACTIVE | Noted: 2024-08-30

## 2024-08-30 RX ORDER — DICYCLOMINE HYDROCHLORIDE 10 MG/1
1 TABLET CAPSULE ORAL
Qty: 90 | Refills: 3 | Status: ACTIVE | COMMUNITY

## 2024-08-30 RX ORDER — LEVOTHYROXINE SODIUM 100 UG/1
1 TABLET IN THE MORNING ON AN EMPTY STOMACH TABLET ORAL ONCE A DAY
Refills: 0 | Status: ACTIVE | COMMUNITY

## 2024-08-30 RX ORDER — APIXABAN 5 MG/1
1 TABLET TABLET, FILM COATED ORAL
Status: ACTIVE | COMMUNITY

## 2024-08-30 RX ORDER — ROSUVASTATIN 20 MG/1
1 TABLET TABLET, FILM COATED ORAL ONCE A DAY
Status: ACTIVE | COMMUNITY

## 2024-08-30 RX ORDER — ONDANSETRON 4 MG/1
1 TABLET ON THE TONGUE AND ALLOW TO DISSOLVE TABLET, ORALLY DISINTEGRATING ORAL
Qty: 30 | Refills: 2 | OUTPATIENT
Start: 2024-08-30

## 2024-08-30 RX ORDER — DICYCLOMINE HYDROCHLORIDE 10 MG/1
1 TABLET CAPSULE ORAL
Qty: 90 | Refills: 5

## 2024-08-30 RX ORDER — DULOXETINE HYDROCHLORIDE 60 MG/1
1 CAPSULE CAPSULE, DELAYED RELEASE ORAL ONCE A DAY
Refills: 0 | Status: ACTIVE | COMMUNITY

## 2024-08-30 RX ORDER — POLYETHYLENE GLYCOL 3350, SODIUM CHLORIDE, SODIUM BICARBONATE, POTASSIUM CHLORIDE 420; 11.2; 5.72; 1.48 G/4L; G/4L; G/4L; G/4L
AS DIRECTED POWDER, FOR SOLUTION ORAL
Status: ON HOLD | COMMUNITY
Start: 2022-06-14

## 2024-08-30 RX ORDER — GABAPENTIN 300 MG/1
2 CAOS CAPSULE ORAL
Refills: 0 | Status: ACTIVE | COMMUNITY

## 2024-08-30 RX ORDER — OMEPRAZOLE 40 MG/1
1 CAPSULE 30 MINUTES BEFORE MORNING MEAL CAPSULE, DELAYED RELEASE ORAL ONCE A DAY
Refills: 0 | Status: ACTIVE | COMMUNITY
Start: 2014-06-03

## 2024-08-30 RX ORDER — LINACLOTIDE 290 UG/1
1 CAPSULE AT LEAST 30 MINUTES BEFORE THE FIRST MEAL OF THE DAY ON AN EMPTY STOMACH CAPSULE, GELATIN COATED ORAL ONCE A DAY
Qty: 30 | Refills: 11 | Status: ON HOLD | COMMUNITY

## 2024-08-30 RX ORDER — DILTIAZEM HYDROCHLORIDE 180 MG/1
1 CAPSULE CAPSULE, COATED, EXTENDED RELEASE ORAL
Status: ACTIVE | COMMUNITY

## 2024-08-30 RX ORDER — CHOLECALCIFEROL TAB 50 MCG (2000 UNIT) 50 MCG
ONCE PER WEEK TAB ORAL
Status: ACTIVE | COMMUNITY

## 2024-08-30 NOTE — HPI-TODAY'S VISIT:
This is a 63-year-old woman with a history of multiple medical problems including chronic renal insufficiency, irritable bowel syndrome, gastric sleeve, atrial fibrillation status post ablation on Eliquis, colon cancer screening up-to-date and due in 2032, left lower quadrant abdominal pain, and chronic constipation presenting for follow-up. She was last seen in the office 11/20/2023.  She had left lower quadrant abdominal pain and constipation associated with IBS.  She had experienced a recent exacerbation possibly associated with a viral illness.  Her symptoms had significantly improved.  She had benefited from taking Linzess but her insurance company would not cover it.  Insurance had changed to Medicare.  Linzess 290 mcg daily was  prescribed.  She was to add stool softeners or MiraLAX if needed and continue dicyclomine as needed for abdominal pain.  She had been taking it routinely and was instructed to use it as needed only.  She was to take 2 tablets per dose if needed. She is taking omeprazole 40 mg daily.  Acid reflux symptoms are well-controlled.  She has been taking dicyclomine 10 mg 2 twice a day.  If she does not take regular doses, she experiences abdominal pain.  This symptom is controlled on dicyclomine.  She has a nausea 2 or 3 times a week.  She infrequently vomits.  She is out of ondansetron and is requesting a refill.  She is having regular bowel movements without the use of medication.  She denies any other abdominal symptoms. She believes she has done surprisingly well with her abdominal symptoms in light of recent stress.  Her daughter who is in her 40s is an alcoholic and has continued to drink.  She has recently been diagnosed with Warnicke Korsakoff and is unable to walk and has altered mental status without hope of recovery.  Her daughter is currently residing in her living room and requiring extensive care.  She does not have insurance which compounds stressors.

## 2024-08-30 NOTE — PHYSICAL EXAM GASTROINTESTINAL
Abdomen , soft, nontender, nondistended , no guarding or rigidity , no masses palpable , normal bowel sounds , Liver and Spleen , no hepatosplenomegaly; limited due to body habitus

## 2025-02-01 ENCOUNTER — ERX REFILL RESPONSE (OUTPATIENT)
Dept: URBAN - METROPOLITAN AREA CLINIC 113 | Facility: CLINIC | Age: 65
End: 2025-02-01

## 2025-02-01 RX ORDER — DICYCLOMINE HYDROCHLORIDE 10 MG/1
1 TABLET CAPSULE ORAL
Qty: 90 | Refills: 5 | OUTPATIENT

## 2025-06-27 ENCOUNTER — TELEPHONE ENCOUNTER (OUTPATIENT)
Dept: URBAN - METROPOLITAN AREA CLINIC 113 | Facility: CLINIC | Age: 65
End: 2025-06-27

## 2025-06-27 RX ORDER — DICYCLOMINE HYDROCHLORIDE 10 MG/1
1 TABLET CAPSULE ORAL
Qty: 90 | Refills: 5

## 2025-06-28 ENCOUNTER — ERX REFILL RESPONSE (OUTPATIENT)
Dept: URBAN - METROPOLITAN AREA CLINIC 113 | Facility: CLINIC | Age: 65
End: 2025-06-28

## 2025-06-28 RX ORDER — DICYCLOMINE HYDROCHLORIDE 10 MG/1
1 TABLET CAPSULE ORAL
Qty: 90 | Refills: 5

## 2025-08-05 ENCOUNTER — OFFICE VISIT (OUTPATIENT)
Dept: URBAN - METROPOLITAN AREA CLINIC 113 | Facility: CLINIC | Age: 65
End: 2025-08-05

## 2025-08-05 RX ORDER — LINACLOTIDE 290 UG/1
1 CAPSULE AT LEAST 30 MINUTES BEFORE THE FIRST MEAL OF THE DAY ON AN EMPTY STOMACH CAPSULE, GELATIN COATED ORAL ONCE A DAY
Qty: 30 | Refills: 11 | Status: ON HOLD | COMMUNITY

## 2025-08-05 RX ORDER — DILTIAZEM HYDROCHLORIDE 180 MG/1
1 CAPSULE CAPSULE, COATED, EXTENDED RELEASE ORAL
Status: ACTIVE | COMMUNITY

## 2025-08-05 RX ORDER — DULOXETINE HYDROCHLORIDE 60 MG/1
1 CAPSULE CAPSULE, DELAYED RELEASE ORAL ONCE A DAY
Refills: 0 | Status: ACTIVE | COMMUNITY

## 2025-08-05 RX ORDER — ONDANSETRON 4 MG/1
1 TABLET ON THE TONGUE AND ALLOW TO DISSOLVE TABLET, ORALLY DISINTEGRATING ORAL
Qty: 30 | Refills: 2 | Status: ACTIVE | COMMUNITY
Start: 2024-08-30

## 2025-08-05 RX ORDER — GABAPENTIN 300 MG/1
2 CAOS CAPSULE ORAL
Refills: 0 | Status: ACTIVE | COMMUNITY

## 2025-08-05 RX ORDER — DICYCLOMINE HYDROCHLORIDE 10 MG/1
1 TABLET CAPSULE ORAL
Qty: 90 | Refills: 5 | Status: ACTIVE | COMMUNITY

## 2025-08-05 RX ORDER — OMEPRAZOLE 40 MG/1
1 CAPSULE 30 MINUTES BEFORE MORNING MEAL CAPSULE, DELAYED RELEASE ORAL ONCE A DAY
Refills: 0 | Status: ACTIVE | COMMUNITY
Start: 2014-06-03

## 2025-08-05 RX ORDER — LEVOTHYROXINE SODIUM 100 UG/1
1 TABLET IN THE MORNING ON AN EMPTY STOMACH TABLET ORAL ONCE A DAY
Refills: 0 | Status: ACTIVE | COMMUNITY

## 2025-08-05 RX ORDER — POLYETHYLENE GLYCOL 3350, SODIUM CHLORIDE, SODIUM BICARBONATE, POTASSIUM CHLORIDE 420; 11.2; 5.72; 1.48 G/4L; G/4L; G/4L; G/4L
AS DIRECTED POWDER, FOR SOLUTION ORAL
Status: ON HOLD | COMMUNITY
Start: 2022-06-14

## 2025-08-05 RX ORDER — APIXABAN 5 MG/1
1 TABLET TABLET, FILM COATED ORAL
Status: ACTIVE | COMMUNITY

## 2025-08-05 RX ORDER — ROSUVASTATIN 20 MG/1
1 TABLET TABLET, FILM COATED ORAL ONCE A DAY
Status: ACTIVE | COMMUNITY

## 2025-08-05 RX ORDER — CHOLECALCIFEROL TAB 50 MCG (2000 UNIT) 50 MCG
ONCE PER WEEK TAB ORAL
Status: ACTIVE | COMMUNITY